# Patient Record
Sex: FEMALE | Race: BLACK OR AFRICAN AMERICAN
[De-identification: names, ages, dates, MRNs, and addresses within clinical notes are randomized per-mention and may not be internally consistent; named-entity substitution may affect disease eponyms.]

---

## 2019-07-06 ENCOUNTER — HOSPITAL ENCOUNTER (EMERGENCY)
Dept: HOSPITAL 72 - EMR | Age: 34
Discharge: HOME | End: 2019-07-06
Payer: MEDICAID

## 2019-07-06 VITALS — SYSTOLIC BLOOD PRESSURE: 93 MMHG | DIASTOLIC BLOOD PRESSURE: 64 MMHG

## 2019-07-06 VITALS — BODY MASS INDEX: 23.92 KG/M2 | HEIGHT: 62 IN | WEIGHT: 130 LBS

## 2019-07-06 VITALS — SYSTOLIC BLOOD PRESSURE: 100 MMHG | DIASTOLIC BLOOD PRESSURE: 66 MMHG

## 2019-07-06 DIAGNOSIS — J45.20: Primary | ICD-10-CM

## 2019-07-06 PROCEDURE — 94644 CONT INHLJ TX 1ST HOUR: CPT

## 2019-07-06 PROCEDURE — 99282 EMERGENCY DEPT VISIT SF MDM: CPT

## 2019-07-06 RX ADMIN — ALBUTEROL SULFATE SCH MG: 2.5 SOLUTION RESPIRATORY (INHALATION) at 08:15

## 2019-07-06 RX ADMIN — Medication SCH MCG: at 07:43

## 2019-07-06 RX ADMIN — ALBUTEROL SULFATE SCH MG: 2.5 SOLUTION RESPIRATORY (INHALATION) at 07:43

## 2019-07-06 RX ADMIN — Medication SCH MCG: at 07:53

## 2019-07-06 RX ADMIN — ALBUTEROL SULFATE SCH MG: 2.5 SOLUTION RESPIRATORY (INHALATION) at 07:53

## 2019-07-06 RX ADMIN — Medication SCH MCG: at 08:15

## 2019-07-06 NOTE — NUR
ED Nurse Note:



pt was reassessed and pt stated she feel much better now, pt has no wheezing at 
the moment.

## 2019-07-06 NOTE — NUR
ED Nurse Note:



RT on bedside giving nebulization. po med given as ordered and pt able to 
tolerate.

## 2019-07-06 NOTE — NUR
ED Nurse Note:



pt walked in due to asthma attack started 6am, pt stated she has history of 
asthma and run out of inhaler. pt has appointment for med refill on monday. pt 
denies pain. ermd on bedside. respiratory therapist notified. will continue to 
monitor.

## 2019-07-06 NOTE — EMERGENCY ROOM REPORT
History of Present Illness


General


Chief Complaint:  Asthma


Source:  Patient





Present Illness


HPI


33-year-old female presents ED for evaluation.  Complaining of shortness of 

breath and wheezing for the last few days.  History of asthma.  States she ran 

out of her inhaler.  Denies cough.  Denies fevers or chills.  Denies sick 

contacts or recent travel.  No other aggravating or relieving factors.  Denies 

any other associated symptoms


Allergies:  


Coded Allergies:  


     No Known Allergies (Unverified , 7/6/19)





Patient History


Past Medical History:  asthma


Past Surgical History:  none


Pertinent Family History:  none


Social History:  Denies: smoking, alcohol use, drug use


Pregnant Now:  No


Immunizations:  UTD


Reviewed Nursing Documentation:  PMH: Agreed; PSxH: Agreed





Nursing Documentation-PMH


Past Medical History:  No History, Except For


Hx Asthma:  Yes





Review of Systems


All Other Systems:  negative except mentioned in HPI





Physical Exam





Vital Signs








  Date Time  Temp Pulse Resp B/P (MAP) Pulse Ox O2 Delivery O2 Flow Rate FiO2


 


7/6/19 07:26 98.1 76 20 93/64 (74) 100 Room Air  


 


7/6/19 07:48        21








Sp02 EP Interpretation:  reviewed, normal


General Appearance:  no apparent distress, alert, GCS 15, non-toxic


Head:  normocephalic


Eyes:  bilateral eye normal inspection, bilateral eye PERRL


ENT:  normal ENT inspection


Neck:  normal inspection


Respiratory:  decreased breath sounds, wheezing


Cardiovascular #1:  regular rate, rhythm, no edema


Gastrointestinal:  normal inspection


Rectal:  deferred


Genitourinary:  no CVA tenderness


Musculoskeletal:  normal inspection


Neurologic:  alert, oriented x3, responsive, motor strength/tone normal, 

sensory intact, speech normal


Psychiatric:  normal inspection


Skin:  normal color


Lymphatic:  normal inspection





Medical Decision Making


Diagnostic Impression:  


 Primary Impression:  


 Asthma


 Qualified Codes:  J45.20 - Mild intermittent asthma, uncomplicated


ER Course


Hospital Course 


32 yo F presents with wheezing. h/o asthma 





Differential diagnoses include: URI, bronchitis, asthma/COPD, pneumonia 





Clinical course


Patient placed on stretcher.  After initial history, physical exam reveals a 

female in mild distress.  Bilateral TM unremarkable.  No pharyngeal erythema.  

No tonsillar exudates.  No lymphadenopathy.  reduced breath sounds bilaterally.

  wheezing noted.  Patient given Prednisone and albuterol treatment in ED with 

symptoms improved.  Reassurance given





discussed Findings with patient.  Safe for discharge for close outpatient follow

-up.  Will provide referrals





Diagnosis - asthma exacerbation





Stable and discharged home with prescriptions for prednisone, albuterol.  

Instructed to followup with PMD.  Return to ED if symptoms recur or worsen





Last Vital Signs








  Date Time  Temp Pulse Resp B/P (MAP) Pulse Ox O2 Delivery O2 Flow Rate FiO2


 


7/6/19 08:52 98.1 74 16 100/66 99 Room Air  21








Status:  improved


Disposition:  HOME, SELF-CARE


Condition:  Stable


Scripts


Prednisone* (PREDNISONE*) 20 Mg Tablet


40 MG ORAL DAILY, #10 TAB


   Prov: Terry Nicole MD         7/6/19 


Albuterol Sulfate* (ALBUTEROL SULFATE MDI*) 8.5 Gm Hfa.aer.ad


2 PUFF INH Q6H, #1 EA 0 Refills


   Prov: Terry Nicole MD         7/6/19


Referrals:  


[facility]


Patient Instructions:  Asthma, Adult











Terry Nicole MD Jul 6, 2019 09:10